# Patient Record
Sex: FEMALE | Race: ASIAN | URBAN - METROPOLITAN AREA
[De-identification: names, ages, dates, MRNs, and addresses within clinical notes are randomized per-mention and may not be internally consistent; named-entity substitution may affect disease eponyms.]

---

## 2017-07-11 ENCOUNTER — OUTPATIENT (OUTPATIENT)
Dept: OUTPATIENT SERVICES | Facility: HOSPITAL | Age: 62
LOS: 1 days | Discharge: HOME | End: 2017-07-11
Payer: MEDICARE

## 2017-07-11 PROCEDURE — 90792 PSYCH DIAG EVAL W/MED SRVCS: CPT

## 2017-07-12 DIAGNOSIS — F33.9 MAJOR DEPRESSIVE DISORDER, RECURRENT, UNSPECIFIED: ICD-10-CM

## 2017-08-23 PROCEDURE — 99213 OFFICE O/P EST LOW 20 MIN: CPT

## 2017-09-20 PROCEDURE — 99213 OFFICE O/P EST LOW 20 MIN: CPT

## 2017-11-03 PROCEDURE — 99213 OFFICE O/P EST LOW 20 MIN: CPT

## 2018-01-03 PROCEDURE — 99213 OFFICE O/P EST LOW 20 MIN: CPT

## 2018-04-30 PROCEDURE — 99213 OFFICE O/P EST LOW 20 MIN: CPT

## 2018-05-23 PROCEDURE — 99213 OFFICE O/P EST LOW 20 MIN: CPT

## 2018-07-02 PROCEDURE — 99213 OFFICE O/P EST LOW 20 MIN: CPT

## 2018-11-09 PROCEDURE — 99213 OFFICE O/P EST LOW 20 MIN: CPT

## 2019-03-15 PROCEDURE — 99213 OFFICE O/P EST LOW 20 MIN: CPT

## 2019-09-12 PROCEDURE — 99213 OFFICE O/P EST LOW 20 MIN: CPT

## 2019-09-21 ENCOUNTER — EMERGENCY (EMERGENCY)
Facility: HOSPITAL | Age: 64
LOS: 1 days | Discharge: HOME | End: 2019-09-21
Attending: EMERGENCY MEDICINE | Admitting: EMERGENCY MEDICINE
Payer: MEDICARE

## 2019-09-21 VITALS
RESPIRATION RATE: 18 BRPM | SYSTOLIC BLOOD PRESSURE: 152 MMHG | HEART RATE: 82 BPM | DIASTOLIC BLOOD PRESSURE: 74 MMHG | TEMPERATURE: 98 F

## 2019-09-21 DIAGNOSIS — Z90.11 ACQUIRED ABSENCE OF RIGHT BREAST AND NIPPLE: Chronic | ICD-10-CM

## 2019-09-21 LAB
ALBUMIN SERPL ELPH-MCNC: 4.2 G/DL — SIGNIFICANT CHANGE UP (ref 3.3–5)
ALP SERPL-CCNC: 85 U/L — SIGNIFICANT CHANGE UP (ref 40–120)
ALT FLD-CCNC: 14 U/L — SIGNIFICANT CHANGE UP (ref 4–33)
ANION GAP SERPL CALC-SCNC: 12 MMO/L — SIGNIFICANT CHANGE UP (ref 7–14)
APTT BLD: 25 SEC — LOW (ref 27.5–36.3)
AST SERPL-CCNC: 17 U/L — SIGNIFICANT CHANGE UP (ref 4–32)
BILIRUB SERPL-MCNC: 0.3 MG/DL — SIGNIFICANT CHANGE UP (ref 0.2–1.2)
BUN SERPL-MCNC: 18 MG/DL — SIGNIFICANT CHANGE UP (ref 7–23)
CALCIUM SERPL-MCNC: 9.9 MG/DL — SIGNIFICANT CHANGE UP (ref 8.4–10.5)
CHLORIDE SERPL-SCNC: 95 MMOL/L — LOW (ref 98–107)
CK MB BLD-MCNC: 1.72 NG/ML — SIGNIFICANT CHANGE UP (ref 1–4.7)
CK SERPL-CCNC: 58 U/L — SIGNIFICANT CHANGE UP (ref 25–170)
CO2 SERPL-SCNC: 26 MMOL/L — SIGNIFICANT CHANGE UP (ref 22–31)
CREAT SERPL-MCNC: 1.17 MG/DL — SIGNIFICANT CHANGE UP (ref 0.5–1.3)
GLUCOSE SERPL-MCNC: 89 MG/DL — SIGNIFICANT CHANGE UP (ref 70–99)
HCT VFR BLD CALC: 31.2 % — LOW (ref 34.5–45)
HGB BLD-MCNC: 10.6 G/DL — LOW (ref 11.5–15.5)
INR BLD: 1 — SIGNIFICANT CHANGE UP (ref 0.88–1.17)
MAGNESIUM SERPL-MCNC: 1.8 MG/DL — SIGNIFICANT CHANGE UP (ref 1.6–2.6)
MCHC RBC-ENTMCNC: 33.4 PG — SIGNIFICANT CHANGE UP (ref 27–34)
MCHC RBC-ENTMCNC: 34 % — SIGNIFICANT CHANGE UP (ref 32–36)
MCV RBC AUTO: 98.4 FL — SIGNIFICANT CHANGE UP (ref 80–100)
NRBC # FLD: 0 K/UL — SIGNIFICANT CHANGE UP (ref 0–0)
PHOSPHATE SERPL-MCNC: 2.9 MG/DL — SIGNIFICANT CHANGE UP (ref 2.5–4.5)
PLATELET # BLD AUTO: 237 K/UL — SIGNIFICANT CHANGE UP (ref 150–400)
PMV BLD: 8.5 FL — SIGNIFICANT CHANGE UP (ref 7–13)
POTASSIUM SERPL-MCNC: 4.4 MMOL/L — SIGNIFICANT CHANGE UP (ref 3.5–5.3)
POTASSIUM SERPL-SCNC: 4.4 MMOL/L — SIGNIFICANT CHANGE UP (ref 3.5–5.3)
PROT SERPL-MCNC: 7.5 G/DL — SIGNIFICANT CHANGE UP (ref 6–8.3)
PROTHROM AB SERPL-ACNC: 11.1 SEC — SIGNIFICANT CHANGE UP (ref 9.8–13.1)
RBC # BLD: 3.17 M/UL — LOW (ref 3.8–5.2)
RBC # FLD: 13.7 % — SIGNIFICANT CHANGE UP (ref 10.3–14.5)
SODIUM SERPL-SCNC: 133 MMOL/L — LOW (ref 135–145)
TROPONIN T, HIGH SENSITIVITY: 13 NG/L — SIGNIFICANT CHANGE UP (ref ?–14)
TROPONIN T, HIGH SENSITIVITY: 15 NG/L — SIGNIFICANT CHANGE UP (ref ?–14)
WBC # BLD: 4.27 K/UL — SIGNIFICANT CHANGE UP (ref 3.8–10.5)
WBC # FLD AUTO: 4.27 K/UL — SIGNIFICANT CHANGE UP (ref 3.8–10.5)

## 2019-09-21 PROCEDURE — 99285 EMERGENCY DEPT VISIT HI MDM: CPT | Mod: GC

## 2019-09-21 PROCEDURE — 73030 X-RAY EXAM OF SHOULDER: CPT | Mod: 26,LT

## 2019-09-21 PROCEDURE — 71275 CT ANGIOGRAPHY CHEST: CPT | Mod: 26

## 2019-09-21 RX ORDER — SIMVASTATIN 20 MG/1
1 TABLET, FILM COATED ORAL
Qty: 0 | Refills: 0 | DISCHARGE

## 2019-09-21 RX ORDER — PALBOCICLIB 100 MG/1
1 CAPSULE ORAL
Qty: 0 | Refills: 0 | DISCHARGE

## 2019-09-21 RX ORDER — LOSARTAN POTASSIUM 100 MG/1
1 TABLET, FILM COATED ORAL
Qty: 0 | Refills: 0 | DISCHARGE

## 2019-09-21 RX ORDER — ATENOLOL 25 MG/1
1 TABLET ORAL
Qty: 0 | Refills: 0 | DISCHARGE

## 2019-09-21 RX ORDER — METFORMIN HYDROCHLORIDE 850 MG/1
1 TABLET ORAL
Qty: 0 | Refills: 0 | DISCHARGE

## 2019-09-21 RX ORDER — DULOXETINE HYDROCHLORIDE 30 MG/1
2 CAPSULE, DELAYED RELEASE ORAL
Qty: 0 | Refills: 0 | DISCHARGE

## 2019-09-21 RX ORDER — LETROZOLE 2.5 MG/1
1 TABLET, FILM COATED ORAL
Qty: 0 | Refills: 0 | DISCHARGE

## 2019-09-21 RX ORDER — ASPIRIN/CALCIUM CARB/MAGNESIUM 324 MG
1 TABLET ORAL
Qty: 0 | Refills: 0 | DISCHARGE

## 2019-09-21 RX ORDER — AMLODIPINE BESYLATE 2.5 MG/1
1 TABLET ORAL
Qty: 0 | Refills: 0 | DISCHARGE

## 2019-09-21 RX ORDER — FOLIC ACID 0.8 MG
0 TABLET ORAL
Qty: 0 | Refills: 0 | DISCHARGE

## 2019-09-21 RX ORDER — FERROUS GLUCONATE 100 %
0 POWDER (GRAM) MISCELLANEOUS
Qty: 0 | Refills: 0 | DISCHARGE

## 2019-09-21 NOTE — ED PROVIDER NOTE - PATIENT PORTAL LINK FT
You can access the FollowMyHealth Patient Portal offered by [facility] by registering at the following website: [email or web address]. By joining [employer]’s FollowMyHealth portal, you will also be able to view your health information using other applications (apps) compatible with our system.

## 2019-09-21 NOTE — ED PROVIDER NOTE - PHYSICAL EXAMINATION
PHYSICAL EXAM:  GENERAL: NAD, well-developed  HEAD:  Atraumatic, Normocephalic  EYES: EOMI, PERRLA, conjunctiva and sclera clear  NECK: Supple, No JVD  CHEST/LUNG: left lower base crackles, otherwise clear to auscultation posteriorly   HEART: Regular rate and rhythm; No murmurs, rubs, or gallops  ABDOMEN: Soft, Nontender, Nondistended; Bowel sounds present  EXTREMITIES:, No clubbing, cyanosis, or edema  PSYCH: AAOx3  NEUROLOGY: non-focal  SKIN: No rashes or lesions

## 2019-09-21 NOTE — ED PROVIDER NOTE - OBJECTIVE STATEMENT
63 year old with history of anxiety/ depression, HTN metastatic breast cancer coming in with CC of SOB and left shoulder pain. States that she chronically has SOB over the past year and a half however she awoke this morning with very severe left shoulder pain . She was initially 63 year old with history of anxiety/ depression, HTN metastatic breast cancer coming in with CC of SOB and left shoulder pain. States that she chronically has SOB over the past year and a half however she awoke this morning with very severe left shoulder pain . She was initially diagnosed in 2004 and experienced a R sided mastectomy with chemo. was told she was in remission and had surveillance of the next 5 years. In 2014 experienced bone pain and through a DEXA scan / PET scan  she was found to have metastatic breast cancer to the bones. She notes that she has chills, denies fevers, cp, nausea vomiting. Had an unintentional weight loss of 4-5 pound with a good appetite currently. Of note 2 of her half sisters have history of cancer. 63 year old with history of anxiety/ depression  ( on Klonopine and duloxetine) , HTN metastatic breast cancer coming in with CC of SOB and left shoulder pain. States that she chronically has SOB over the past year and a half however she awoke this morning with very severe left shoulder pain . She was initially diagnosed in  and experienced a R sided mastectomy with chemo. Was told she was in remission and had surveillance of the next 5 years. In  experienced bone pain and through a DEXA scan / PET scan  she was found to have metastatic breast cancer to the bones. She notes that she has chills, denies fevers, cp, nausea vomiting. Had an unintentional weight loss of 4-5 pound with a good appetite currently. Of note 2 of her half sisters have history of cancer. one of her half sisters recently  of breast cancer.

## 2019-09-21 NOTE — ED ADULT TRIAGE NOTE - CHIEF COMPLAINT QUOTE
Pt c/o mid-sternal chest pain since 11am that radiates to left arm and hand. Pt c/o nausea and some SOB

## 2019-09-21 NOTE — ED ADULT NURSE REASSESSMENT NOTE - NS ED NURSE REASSESS COMMENT FT1
Report received from raghavendra RN. Pt aaox4. Vital signs reassessed as noted. Pt denies chest pain, SOB, N/V/D, palpitations, diaphoresis. Pt resting in stretcher, appears comfortable. Pt family at bedside. Call light within reach. Will continue to monitor.

## 2019-09-21 NOTE — ED PROVIDER NOTE - CLINICAL SUMMARY MEDICAL DECISION MAKING FREE TEXT BOX
Patient initially placed on supplemental O2 given symptomatic SOB. However was weaned within minutes saturating 97-98% on RA. Left shoulder X ray ordered to further evaluate for lytic lesions. EKG WNL. Cardiac enzymes ordered. CTA ordered to rule out PE. 64 yo with Dm, HTN, anxiety/ depression,  with metastatic breast cancer presenting with cc of SOB and left shoulder pain.

## 2019-09-21 NOTE — ED ADULT NURSE NOTE - OBJECTIVE STATEMENT
Pt rcvd to room 23 c/o L shoulder pain radiating to L wrist x apprx 5 hours. States pain started after waking up. Denies any changes in activity or recent trauma. Also c/o SOB and nausea with Shoulder pain. Also having tingling to B/L feet. Hx: HTN, DM, HLD, Breast CA with mets to pelvis,, currently on oral chemo. Pt is awake/alert, Ambulatory without assistance at baseline. Respirations even/unlabored. Able to speak in full sentences. NSR on cardiac monitor. Awaiting MD braun. Will continue to monitor.

## 2019-09-21 NOTE — ED PROVIDER NOTE - NSFOLLOWUPINSTRUCTIONS_ED_ALL_ED_FT
You came to the hospital because you were experiencing severe left shoulder pain with difficulty breathing. You had a X- ray of the shoulder which did not demonstrate any lytic lesions, fractures or dislocation. You had cardiac enzymes which were attained _______. You were saturating well on RA.     You had a CT  of your chest to rule out pulmonary embolism. You came to the hospital because you were experiencing severe left shoulder pain with difficulty breathing. You had a X- ray of the shoulder which did not demonstrate any lytic lesions, fractures or dislocation. You were seen today in the emergency room for chest pain. Although the testing done today indicates that your pain is not from an acute emergency, your pain could still represent a problem with your heart. You need to follow up with your doctor and/or a cardiologist in the next 48-72 hours. If you develop any new or worsening symptoms you need to return immediately to the emergency department. If you experience any of the following please come right back to the emergency room: chest pain that becomes much worse with walking up stairs or exercising, uncontrollable nausea and vomiting, severe chest pain that will not go away, passing out, new persistent numbness and/or weakness. If we discussed that this pain was likely due to a muscle strain or sprain you should take over the counter medications such as Tylenol. You should avoid taking medications such as ibuprofen, Motrin, Advil or other NSAIDs until you speak with your doctor about your pain.  You were saturating well on RA.     You had a CT  of your chest to rule out pulmonary embolism that was negative however there was a new small lesion that you need to follow up with you.

## 2019-09-21 NOTE — ED PROVIDER NOTE - ATTENDING CONTRIBUTION TO CARE
I performed a face to face history and physical exam of the patient and discussed their management with the resident. I reviewed the resident's note and agree with the documented findings and plan of care. 62 y/o female with hx of metastatic breast cancer, HTN, DM, no hx of CAD, hx of anxiety p/w L shoulder pain which started this morning, radiating to back down arm, pt with baseline SOB, no CP, no dizziness, no fever, no cough, no n/v, pt has anxiety and tingling every night as per family in room, on exam pt lungs cta b/l, feeling better, slightly anxious, +tenderness of L trapezius and upper shoulder, ROM limited secondary to pain, NVI, 1)obtain recent MRI results 2)EKG 3)Tele 4)CBC, CMP, CE, VBG 5)L shoulder xray, CTA, 6)reevaluate

## 2019-09-21 NOTE — ED PROVIDER NOTE - FAMILY HISTORY
Sibling  Still living? Unknown  Family history of breast cancer, Age at diagnosis: Age Unknown  Family history of cancer, Age at diagnosis: Age Unknown

## 2019-09-21 NOTE — ED PROVIDER NOTE - CHIEF COMPLAINT
The patient is a 63y Female complaining of The patient is a 63y Female complaining of left shulder pain The patient is a 63y Female complaining of left shoulder pain

## 2019-09-21 NOTE — ED PROVIDER NOTE - PROGRESS NOTE DETAILS
Patient initially placed on supplemental O2 given symptomatic SOB. However was weaned within minutes saturating 97-98% on RA. Left shoulder X ray ordered to further evaluate for lytic lesions. EKG WNL. Cardiac enzymes ordered. CTA ordered to rule out PE. Xray of left shoulder with prelim read noting no presence of lytic lesion. Resident: Jorge Parrish – Pt was re-evaluated at bedside, VSS, feeling better overall. Results were discussed with patient as well as return precautions and follow up plan with PCP and/or specialist. Time was taken to answer any questions that the patient had before providing them with discharge paperwork.

## 2019-09-22 VITALS
DIASTOLIC BLOOD PRESSURE: 58 MMHG | HEART RATE: 73 BPM | TEMPERATURE: 98 F | RESPIRATION RATE: 16 BRPM | SYSTOLIC BLOOD PRESSURE: 126 MMHG | OXYGEN SATURATION: 100 %

## 2019-10-01 ENCOUNTER — OUTPATIENT (OUTPATIENT)
Dept: OUTPATIENT SERVICES | Facility: HOSPITAL | Age: 64
LOS: 1 days | End: 2019-10-01
Payer: MEDICARE

## 2019-10-01 DIAGNOSIS — Z90.11 ACQUIRED ABSENCE OF RIGHT BREAST AND NIPPLE: Chronic | ICD-10-CM

## 2019-10-01 PROCEDURE — G9001: CPT

## 2019-10-04 DIAGNOSIS — Z71.89 OTHER SPECIFIED COUNSELING: ICD-10-CM

## 2019-10-04 PROBLEM — F32.9 MAJOR DEPRESSIVE DISORDER, SINGLE EPISODE, UNSPECIFIED: Chronic | Status: ACTIVE | Noted: 2019-09-21

## 2019-10-04 PROBLEM — I10 ESSENTIAL (PRIMARY) HYPERTENSION: Chronic | Status: ACTIVE | Noted: 2019-09-21

## 2019-10-04 PROBLEM — E11.9 TYPE 2 DIABETES MELLITUS WITHOUT COMPLICATIONS: Chronic | Status: ACTIVE | Noted: 2019-09-21

## 2021-02-16 PROCEDURE — 99443: CPT | Mod: 95

## 2021-03-15 PROCEDURE — 99443: CPT | Mod: 95

## 2021-05-18 PROCEDURE — 99443: CPT

## 2021-07-21 PROCEDURE — 99443: CPT

## 2021-10-29 PROCEDURE — 99443: CPT

## 2021-12-15 PROCEDURE — 99443: CPT | Mod: 95

## 2022-01-06 ENCOUNTER — APPOINTMENT (OUTPATIENT)
Dept: NEPHROLOGY | Facility: CLINIC | Age: 67
End: 2022-01-06
Payer: MEDICARE

## 2022-01-06 DIAGNOSIS — E87.1 HYPO-OSMOLALITY AND HYPONATREMIA: ICD-10-CM

## 2022-01-06 PROCEDURE — 99205 OFFICE O/P NEW HI 60 MIN: CPT | Mod: 95

## 2022-01-06 NOTE — REVIEW OF SYSTEMS
[Feeling Tired] : feeling tired [Recent Weight Loss (___ Lbs)] : recent [unfilled] ~Ulb weight loss [Confused] : confusion [Dizziness] : dizziness [Depression] : depression [Negative] : Integumentary [FreeTextEntry5] : intermittent SOB and LE edema [FreeTextEntry9] : intermittent metastatic bone pain [de-identified] : forgetfulness, balance problems [de-identified] : DM

## 2022-01-06 NOTE — REASON FOR VISIT
[Home] : at home, [unfilled] , at the time of the visit. [Medical Office: ([facility])___] : at the medical office located in  [Verbal consent obtained from patient] : the patient, [unfilled] [Initial Evaluation] : an initial evaluation

## 2022-01-06 NOTE — HISTORY OF PRESENT ILLNESS
[FreeTextEntry1] : Pt. referred from Oncology/ [facility] [place] for hyponatremia, she is accompanied by her son during the telehealth visit.\par \par Mrs. Frazier is a 66 year old  female from [facility], a homemaker, moved to the  40 years ago, with HTN x diagnosed in 2005 well controlled since, DM2 since 2005 well controlled without any known retinopathy or neuropathy, depression since 1994-95, diagnosed with breast CA in 2004 s/p mastectomy and chemotherapy at that time for 4 months, and been on oral chemo since, with bone mets noted in 2015 s/p radiation x 1 month has been on oral chemotherapy and radiation since Sep 2021, HLD, CHF, referred for evaluation and management of hyponatremia.\par \par Pt. states that she was first made aware of hyponatremia by her oncologist 4-5 months ago. She has liberalized salt intake since however hyponatremia continues to persist. On review of available medical records from [facility] [place], noted to have hyponatremia even on labs done in May 2019, which has been intermittently present since.\par \par She reports intermittent dizziness and episodes of falls since 1-1.5 years, forgetfulness x 1 year now worsening, confusion, ?balance problems. Decreased appetite x few months and also related weight loss. She also endorses frequent nausea and intermittent occasional vomiting. States that she does drink a lot of water. As per her son, BP is usually well controlled. Reports intermittent LE edema and SOB that resolves with lasix, takes lasix once a week as needed. Also states that she has nocturia and wakes up multiple times at night to drink water.\par \par Social Hx: Lives at home with  and a son, has one more son, non smoker, no alcohol intake, no illicit drug use\par Family Hx: not significant\par Surgical Hx: mastectomy\par Allergies: NKDA\par \par Current meds\par ASA 81 mg po daily\par metformin 1000 BID\par glipizide 2.5 mg po daily\par losartan 100mg po daily\par amlodipine 5mg po daily\par simvastatin 20mg po daily\par Letrozole\par Ibarance\par cymbalta\par clonazepam\par lasix as needed (20mg)\par folic acid\par Vit D\par Vit C\par Calcium \par Oral iron\par Tylenol as needed\par Tramadol as needed\par pravacid as needed\par zofran as needed\par

## 2022-01-06 NOTE — CONSULT LETTER
[Dear  ___] : Dear  [unfilled], [Consult Letter:] : I had the pleasure of evaluating your patient, [unfilled]. [( Thank you for referring [unfilled] for consultation for _____ )] : Thank you for referring [unfilled] for consultation for [unfilled] [Please see my note below.] : Please see my note below. [Consult Closing:] : Thank you very much for allowing me to participate in the care of this patient.  If you have any questions, please do not hesitate to contact me. [Sincerely,] : Sincerely, [FreeTextEntry3] : Lisa Altman MD

## 2022-01-06 NOTE — ASSESSMENT
[FreeTextEntry1] : Hyponatremia: Pt. with acute on chronic, persistent, mild-moderate, ?symptomatic hyponatremia. Noted to have hyponatremia of 134 even in May 2019, and has had sodium levels in the range of 127-136 since. \par Exact duration of hyponatremia however unknown. \par Etiology of hyponatremia multifactorial in the setting of metastatic breast cancer to bone with bone pain, frequent nausea, use of Cymbalta, low sodium intake and excess water intake. Pt. likely with SIADH, also with component of hypervolemia given hx of CHF with intermittent LE edema and SOB reported per patient.\par Recent serum sodium low at 129. \par Advised patient to liberalize salt intake and limit free water intake.\par Counselled regarding strict glycemic control.\par Counselled regarding optimal nausea and pain control.\par Counselled to take diuretics regularly to avoid volume overload.\par Pt. will discuss with her psychiatrist about switching to alternate antidepressant that may not affect serum sodium level. \par Repeat renal panel\par Check TSH, urine sodium, urine osm, serum uric acid\par will consider addition of salt tabs if sodium level remains low. \par \par \par Follow up pending review of labs.\par Script for lab tests sent to pt;s son.\par

## 2022-01-07 PROBLEM — E87.1 HYPONATREMIA: Status: ACTIVE | Noted: 2022-01-06

## 2022-02-14 PROCEDURE — 99443: CPT | Mod: 95

## 2023-03-23 NOTE — ED PROVIDER NOTE - DISPOSITION TYPE
----- Message from Dai Maharaj MD sent at 3/23/2023  2:49 PM CDT -----  Please inform patient that her follow-up urine today is negative for blood.  Please cancel the Urology referral.      Urine shows some possibility of infection.  I will wait for the urine culture to be reported.    
Contacted patient and informed of results/recommendations as noted below. Patient verbally stated understanding. Had no questions or concerns at this time.   
DISCHARGE